# Patient Record
(demographics unavailable — no encounter records)

---

## 2024-12-19 NOTE — HISTORY OF PRESENT ILLNESS
[FreeTextEntry1] : 24 Xiomara is here in follow-up. No seizure like activity since last visit - denies tongue biting, muscle weakness, involuntary movements, staring, confusion/ disorientation, or urinary incontinence. Tolerating keppra well, compliant with medications. No recent fall or injury.  Started to drive short distances, daytime, no highways, and local roads.  Wishes to resume driving on highways. BP at goal.  Exercise classes throughout the week.  Diet varies - - enjoys ice cream, could use improvement with increasing vegetables. Hydrating adequately.   MD Ho HPI's " 24 Xiomara denies seizure like activity since last visit. She reports fatigue related to keppra has fully resolved and is tolerating it well.   24 ambulatory EEG reveals occasional bitemporal slowing. No epileptiform discharges. Suggestive of focal dysfunction in these regions.   Medications- Keppra 500mg twice daily - feels fine, fatigue has fully resolved, no increased irritability reported  Vitamin D 1000 units daily Asa 81mg Atorvastatin 80mg Losartan 100mg  EPILEPSY HPI: Risk Factors - lesional -   complications - febrile seizures - no  development delay - no family hx seizures -no head trauma -no meningitis or encephalitis -no  stroke - yes    Epilepsy Therapy: Current Medications -Keppra  Past Medications/Side effects - fatigue when first taking Keppra now resolved.    Previous Diagnostics:  Last EEG 24 report below  last MRI Brain 24 report below    Zeynep Fernández NP collaborated with the neurologist during today's visit.   ********************************** Ms. XIOMARA GABRIEL is a 73 year old female here today in neurology consultation following discharge from [facility] 2024 dx syncope. Hx ischemic stroke , PFO closure Aug 2022, and hld.   Xiomara is escorted by her  today. Pt is seen for post hospital follow-up. She denies episodes of dizziness, falls, change of mental status or staring since hospital discharge. Her  reports he found pt in the bathroom during the night 2024 after hearing noise of fall, pt was confused and appeared frightened, incontinence of urine, she was sitting on the floor, drifted to her right, appeared to be unconscious for 2 mins, after he called 911, pt was conscious asking to go back to bed, when pt came to she did not know what happened.   On 2024 Xiomara reports one alcoholic drink a few hours prior to episode of LOC in BR, and also one dose of Coricidin HBP Max Strength Night for allergy symptoms. She was started on  mg every 12 hours after EEG at [facility] showed rare left temporal spikes.    She feels very tired on levetiracetam. Would like to consider changing it. No history of episodes of loss of consciousness in the past.   Per chart notes, in 2022, she presented with an episode of aphasia which lasted 20 minutes.  MRI showed recent stroke and she was discharged on aspirin, plavix and statin.    Pt was seen by Dr Madsen following  stroke on 2022, 3/28/2023, and most recently 2024 (following dizziness episodes)   Xiomara reports on 2024 prior to the "grocery store incident of dizziness", pt had just received PT with mechanical neck traction for chronic neck stiffness.   2024 and 2024 ophthalmology appts reportedly with normal results    Medications- Keppra 500mg twice daily  - very tired, symptom not improving  Vitamin D 1000 units daily Asa 81mg Atorvastatin 80mg Losartan 100mg  ADMIT/REG DATE: 24 DISCHARGE DATE: [facility] REPORT DATE/TIME: 24 1011 Primary Care Physician Tiffanie Wasserman MD Discharge Note This is a 73 year old female with history of CVA, HTN, PFO repair who is being observed for syncope vs. seizure. Patient woke up early morning to urinate. After going to the bathroom, her mouth felt dry. She put water on her face and fell. She is uncertain if she lost consciousness at that time as she does not remember falling. Patient's  ran to her side and per patient noted patient to have a blank stare. Patient then lost consciousness and had episode of urinary incontinence. Patient does not recall this episode. Upon awakening, patient was aware of her surroundings, but felt tired. One week ago, patient had an event at the grocery store where she felt disoriented and her vision was unusual. This resolved within minutes. She had reportedly normal outpatient Brain MRI. While in ED, patient's prolactin was found to be elevated. Patient was evaluated by neurology Dr. Madsen evaluated patient and ordered EEG. Patient was placed observation . She was monitored on telemetry with no events. Patient had an EEG which showed rare temporal spikes. Neurology evaluated the patient and recommended starting a trial of Keppra for possible seizure disorder. Patient started on Keppra and advised to follow-up with Dr. Madsen . Patient was recommended not to drive. Echocardiogram with preserved LV ejection fraction aortic root dilatation about 4.1 cm, advised follow-up with cardiology.   2024 prolactin 26.9, GFR 53,   [facility] EEG 2024 [facility] inpatient hospital eeg: rare left temporal spikes but no seizure activity.  2024 [facility] CT HEAD   No obvious acute intracranial hemorrhage or displaced skull fracture. If   clinically indicated, short term follow-up or MRI may be obtained for further evaluation.  [facility]  MRI brain without contrast  2024   No acute infarct, encephalomalacia, midline shift or hemorrhage  Nonspecific extensive T2/FLAIR hyperintensity within white matter may relate   to small vessel ischemic disease/old infarcts although other etiologies   including demyelinating disease not excluded.   Small old left thalamic lacunar infarct and left cerebellar old infarcts. Mild   pontine white matter small vessel ischemic disease.     Zeynep Fernández NP collaborated with the neurologist during today's visit.

## 2024-12-19 NOTE — ASSESSMENT
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 Ambulatory EEG 6/2024 with occasional bitemporal slowing, no spikes.  Tolerating keppra 500mg BID well. Driving short distances, daytime hours and no highways.  Reiterated to pt if you do not feel well to not drive.   Stroke prevention- continue ASA 81mg daily  BP at goal <130/80 Follows with cardiologist Dr Arenas - Gopium - - s/p PFO closure currently on atorvastatin 80mg, LDL 60 (8/2024) at goal <70 A1C 5.6 (2/2024) Encouraged Mediterranean style diet    Seizure Safety:  Wear a helmet when biking or horseback riding No unsupervised swimming Showers rather than baths - no bath alone - risk of drowning Adjust the temperature on hot water heater to avoid scalding If uncontrolled seizures, use microwave rather than stovetop Dont lock door in bathroom, bedroom or on places If fall off bed with seizures, try sleeping with mattress on the floor Use soft or padded furniture Avoid high ladders Take medication as prescribed Follow driving regulations of people with epilepsy. Please visit [employer] : [email or web address]    Follow up in 3 months

## 2024-12-19 NOTE — DATA REVIEWED
[de-identified] : [facility]  MRI brain without contrast  4/30/2024   Multiplanar and multiecho sequence imaging of brain obtained.   Comparison made to CT head 4/30/2024     The midline sagittal structures including the pituitary, corpus callosum,   pineal and craniocervical junction regions are unremarkable.   There is mild prominence of cortical sulci, fissures and ventricles related to   mild generalized volume loss. There is extensive T2/FLAIR hyperintensity   within the periventricular and subcortical white matter which is nonspecific   and may relate to small vessel ischemic disease/old infarcts, although other   etiologies such as demyelinating disease not excluded. There is a small old   left thalamic lacunar infarct. There is mild pontine small vessel ischemic   disease. There are small old left cerebellar lacunar infarcts. Diffusion   imaging reveals no acute or recent infarct. There is no hemorrhage. There are   no abnormal extra-axial collections.   The distal internal carotid and vertebral basilar artery flow-voids are   intact.      There is no pathologic marrow placement. The visualized orbits are   unremarkable. There is no acute sinusitis.     Impression:   No acute infarct, encephalomalacia, midline shift or hemorrhage  Nonspecific extensive T2/FLAIR hyperintensity within white matter may relate   to small vessel ischemic disease/old infarcts although other etiologies   including demyelinating disease not excluded.   Small old left thalamic lacunar infarct and left cerebellar old infarcts. Mild   pontine white matter small vessel ischemic disease.   [de-identified] : 6/7/24 ambulatory EEG reveals occasional bitemporal slowing. No epileptiform discharges. ********************************* [facility] EEG 5/7/2024 [facility] inpatient hospital eeg: rare left temporal spikes but no seizure activity. [de-identified] : 5/6/2024 prolactin 26.9, GFR 53,  *************************************** 5/6/2024 [facility] CT HEAD   PROCEDURE:  CT HEAD OR BRAIN                                                                                                            CLINICAL INDICATION: fall/ hit head on aspirin    TECHNIQUE: CT axial images of the head were obtained without intravenous   contrast. Computer-reconstructed coronal and sagittal images were obtained.    COMPARISON: 4/30/2024.    FINDINGS: There is no obvious acute intracranial hemorrhage, mass effect or   midline shift. Nonspecific moderate periventricular and subcortical white   matter hypodensities are again noted and may be related to age-indeterminate   microvascular ischemic changes/infarcts. Small hypodensities are again noted   at the left thalamus and basal ganglia and may be related to   age-indeterminate/old infarct. Cortical sulci and ventricles are stable.      There is no depressed skull fracture. Mild stranding/edema at the left   occipital scalp soft tissue. There is sinus mucosal thickening. The   tympanomastoid region is unremarkable.      IMPRESSION:    No obvious acute intracranial hemorrhage or displaced skull fracture. If   clinically indicated, short term follow-up or MRI may be obtained for further evaluation.

## 2024-12-19 NOTE — PHYSICAL EXAM
[FreeTextEntry1] : Physical Exam In NAD Mood appropriate affect   Mental Status Awake, alert and oriented to person, place, time and situation. Provides detailed history. Language: speech is fluent  Knows today's date and day of week Serial 7's 5/5 Registration 3/3 Delayed recall 3/3   Cranial Nerves II: VFF III, IV, VI: PERRL, EOMI. V: Facial sensation is normal B/L. VII: Facial strength is symmetric. VIII: wnl IX, X: Palate is midline and elevates symmetrically. XI: Trapezius normal strength. XII: Tongue midline without atrophy or fasciculations.  MOTOR EXAM Muscle tone - no evidence of rigidity or resistance in all 4 extremities. No atrophy or fasciculations. Muscle Strength: arms and legs, proximal and distal flexors and extensors are normal. No UE drift.   COORDINATION Finger to nose: Normal. Heel to shin:    SENSORY Vibration:  Sensation: light touch intact    GAIT Normal

## 2024-12-19 NOTE — HISTORY OF PRESENT ILLNESS
[FreeTextEntry1] : 24 Xiomara is here in follow-up. No seizure like activity since last visit - denies tongue biting, muscle weakness, involuntary movements, staring, confusion/ disorientation, or urinary incontinence. Tolerating keppra well, compliant with medications. No recent fall or injury.  Started to drive short distances, daytime, no highways, and local roads.  Wishes to resume driving on highways. BP at goal.  Exercise classes throughout the week.  Diet varies - - enjoys ice cream, could use improvement with increasing vegetables. Hydrating adequately.   MD Ho HPI's " 24 Xiomara denies seizure like activity since last visit. She reports fatigue related to keppra has fully resolved and is tolerating it well.   24 ambulatory EEG reveals occasional bitemporal slowing. No epileptiform discharges. Suggestive of focal dysfunction in these regions.   Medications- Keppra 500mg twice daily - feels fine, fatigue has fully resolved, no increased irritability reported  Vitamin D 1000 units daily Asa 81mg Atorvastatin 80mg Losartan 100mg  EPILEPSY HPI: Risk Factors - lesional -   complications - febrile seizures - no  development delay - no family hx seizures -no head trauma -no meningitis or encephalitis -no  stroke - yes    Epilepsy Therapy: Current Medications -Keppra  Past Medications/Side effects - fatigue when first taking Keppra now resolved.    Previous Diagnostics:  Last EEG 24 report below  last MRI Brain 24 report below    Zeynep Fernández NP collaborated with the neurologist during today's visit.   ********************************** Ms. XIOMARA GABRIEL is a 73 year old female here today in neurology consultation following discharge from [facility] 2024 dx syncope. Hx ischemic stroke , PFO closure Aug 2022, and hld.   Xiomara is escorted by her  today. Pt is seen for post hospital follow-up. She denies episodes of dizziness, falls, change of mental status or staring since hospital discharge. Her  reports he found pt in the bathroom during the night 2024 after hearing noise of fall, pt was confused and appeared frightened, incontinence of urine, she was sitting on the floor, drifted to her right, appeared to be unconscious for 2 mins, after he called 911, pt was conscious asking to go back to bed, when pt came to she did not know what happened.   On 2024 Xiomara reports one alcoholic drink a few hours prior to episode of LOC in BR, and also one dose of Coricidin HBP Max Strength Night for allergy symptoms. She was started on  mg every 12 hours after EEG at [facility] showed rare left temporal spikes.    She feels very tired on levetiracetam. Would like to consider changing it. No history of episodes of loss of consciousness in the past.   Per chart notes, in 2022, she presented with an episode of aphasia which lasted 20 minutes.  MRI showed recent stroke and she was discharged on aspirin, plavix and statin.    Pt was seen by Dr Madsen following  stroke on 2022, 3/28/2023, and most recently 2024 (following dizziness episodes)   Xiomara reports on 2024 prior to the "grocery store incident of dizziness", pt had just received PT with mechanical neck traction for chronic neck stiffness.   2024 and 2024 ophthalmology appts reportedly with normal results    Medications- Keppra 500mg twice daily  - very tired, symptom not improving  Vitamin D 1000 units daily Asa 81mg Atorvastatin 80mg Losartan 100mg  ADMIT/REG DATE: 24 DISCHARGE DATE: [facility] REPORT DATE/TIME: 24 1011 Primary Care Physician Tiffanie Wasserman MD Discharge Note This is a 73 year old female with history of CVA, HTN, PFO repair who is being observed for syncope vs. seizure. Patient woke up early morning to urinate. After going to the bathroom, her mouth felt dry. She put water on her face and fell. She is uncertain if she lost consciousness at that time as she does not remember falling. Patient's  ran to her side and per patient noted patient to have a blank stare. Patient then lost consciousness and had episode of urinary incontinence. Patient does not recall this episode. Upon awakening, patient was aware of her surroundings, but felt tired. One week ago, patient had an event at the grocery store where she felt disoriented and her vision was unusual. This resolved within minutes. She had reportedly normal outpatient Brain MRI. While in ED, patient's prolactin was found to be elevated. Patient was evaluated by neurology Dr. Madsen evaluated patient and ordered EEG. Patient was placed observation . She was monitored on telemetry with no events. Patient had an EEG which showed rare temporal spikes. Neurology evaluated the patient and recommended starting a trial of Keppra for possible seizure disorder. Patient started on Keppra and advised to follow-up with Dr. Madsen . Patient was recommended not to drive. Echocardiogram with preserved LV ejection fraction aortic root dilatation about 4.1 cm, advised follow-up with cardiology.   2024 prolactin 26.9, GFR 53,   [facility] EEG 2024 [facility] inpatient hospital eeg: rare left temporal spikes but no seizure activity.  2024 [facility] CT HEAD   No obvious acute intracranial hemorrhage or displaced skull fracture. If   clinically indicated, short term follow-up or MRI may be obtained for further evaluation.  [facility]  MRI brain without contrast  2024   No acute infarct, encephalomalacia, midline shift or hemorrhage  Nonspecific extensive T2/FLAIR hyperintensity within white matter may relate   to small vessel ischemic disease/old infarcts although other etiologies   including demyelinating disease not excluded.   Small old left thalamic lacunar infarct and left cerebellar old infarcts. Mild   pontine white matter small vessel ischemic disease.     Zeynep Fernández NP collaborated with the neurologist during today's visit.

## 2024-12-19 NOTE — DATA REVIEWED
[de-identified] : [facility]  MRI brain without contrast  4/30/2024   Multiplanar and multiecho sequence imaging of brain obtained.   Comparison made to CT head 4/30/2024     The midline sagittal structures including the pituitary, corpus callosum,   pineal and craniocervical junction regions are unremarkable.   There is mild prominence of cortical sulci, fissures and ventricles related to   mild generalized volume loss. There is extensive T2/FLAIR hyperintensity   within the periventricular and subcortical white matter which is nonspecific   and may relate to small vessel ischemic disease/old infarcts, although other   etiologies such as demyelinating disease not excluded. There is a small old   left thalamic lacunar infarct. There is mild pontine small vessel ischemic   disease. There are small old left cerebellar lacunar infarcts. Diffusion   imaging reveals no acute or recent infarct. There is no hemorrhage. There are   no abnormal extra-axial collections.   The distal internal carotid and vertebral basilar artery flow-voids are   intact.      There is no pathologic marrow placement. The visualized orbits are   unremarkable. There is no acute sinusitis.     Impression:   No acute infarct, encephalomalacia, midline shift or hemorrhage  Nonspecific extensive T2/FLAIR hyperintensity within white matter may relate   to small vessel ischemic disease/old infarcts although other etiologies   including demyelinating disease not excluded.   Small old left thalamic lacunar infarct and left cerebellar old infarcts. Mild   pontine white matter small vessel ischemic disease.   [de-identified] : 6/7/24 ambulatory EEG reveals occasional bitemporal slowing. No epileptiform discharges. ********************************* [facility] EEG 5/7/2024 [facility] inpatient hospital eeg: rare left temporal spikes but no seizure activity. [de-identified] : 5/6/2024 prolactin 26.9, GFR 53,  *************************************** 5/6/2024 [facility] CT HEAD   PROCEDURE:  CT HEAD OR BRAIN                                                                                                            CLINICAL INDICATION: fall/ hit head on aspirin    TECHNIQUE: CT axial images of the head were obtained without intravenous   contrast. Computer-reconstructed coronal and sagittal images were obtained.    COMPARISON: 4/30/2024.    FINDINGS: There is no obvious acute intracranial hemorrhage, mass effect or   midline shift. Nonspecific moderate periventricular and subcortical white   matter hypodensities are again noted and may be related to age-indeterminate   microvascular ischemic changes/infarcts. Small hypodensities are again noted   at the left thalamus and basal ganglia and may be related to   age-indeterminate/old infarct. Cortical sulci and ventricles are stable.      There is no depressed skull fracture. Mild stranding/edema at the left   occipital scalp soft tissue. There is sinus mucosal thickening. The   tympanomastoid region is unremarkable.      IMPRESSION:    No obvious acute intracranial hemorrhage or displaced skull fracture. If   clinically indicated, short term follow-up or MRI may be obtained for further evaluation.

## 2024-12-19 NOTE — HISTORY OF PRESENT ILLNESS
[FreeTextEntry1] : 24 Xiomara is here in follow-up. No seizure like activity since last visit - denies tongue biting, muscle weakness, involuntary movements, staring, confusion/ disorientation, or urinary incontinence. Tolerating keppra well, compliant with medications. No recent fall or injury.  Started to drive short distances, daytime, no highways, and local roads.  Wishes to resume driving on highways. BP at goal.  Exercise classes throughout the week.  Diet varies - - enjoys ice cream, could use improvement with increasing vegetables. Hydrating adequately.   MD Ho HPI's " 24 Xiomara denies seizure like activity since last visit. She reports fatigue related to keppra has fully resolved and is tolerating it well.   24 ambulatory EEG reveals occasional bitemporal slowing. No epileptiform discharges. Suggestive of focal dysfunction in these regions.   Medications- Keppra 500mg twice daily - feels fine, fatigue has fully resolved, no increased irritability reported  Vitamin D 1000 units daily Asa 81mg Atorvastatin 80mg Losartan 100mg  EPILEPSY HPI: Risk Factors - lesional -   complications - febrile seizures - no  development delay - no family hx seizures -no head trauma -no meningitis or encephalitis -no  stroke - yes    Epilepsy Therapy: Current Medications -Keppra  Past Medications/Side effects - fatigue when first taking Keppra now resolved.    Previous Diagnostics:  Last EEG 24 report below  last MRI Brain 24 report below    Zeynep Fernández NP collaborated with the neurologist during today's visit.   ********************************** Ms. XIOMARA GABRIEL is a 73 year old female here today in neurology consultation following discharge from [facility] 2024 dx syncope. Hx ischemic stroke , PFO closure Aug 2022, and hld.   Xiomara is escorted by her  today. Pt is seen for post hospital follow-up. She denies episodes of dizziness, falls, change of mental status or staring since hospital discharge. Her  reports he found pt in the bathroom during the night 2024 after hearing noise of fall, pt was confused and appeared frightened, incontinence of urine, she was sitting on the floor, drifted to her right, appeared to be unconscious for 2 mins, after he called 911, pt was conscious asking to go back to bed, when pt came to she did not know what happened.   On 2024 Xiomara reports one alcoholic drink a few hours prior to episode of LOC in BR, and also one dose of Coricidin HBP Max Strength Night for allergy symptoms. She was started on  mg every 12 hours after EEG at [facility] showed rare left temporal spikes.    She feels very tired on levetiracetam. Would like to consider changing it. No history of episodes of loss of consciousness in the past.   Per chart notes, in 2022, she presented with an episode of aphasia which lasted 20 minutes.  MRI showed recent stroke and she was discharged on aspirin, plavix and statin.    Pt was seen by Dr Madsen following  stroke on 2022, 3/28/2023, and most recently 2024 (following dizziness episodes)   Xiomara reports on 2024 prior to the "grocery store incident of dizziness", pt had just received PT with mechanical neck traction for chronic neck stiffness.   2024 and 2024 ophthalmology appts reportedly with normal results    Medications- Keppra 500mg twice daily  - very tired, symptom not improving  Vitamin D 1000 units daily Asa 81mg Atorvastatin 80mg Losartan 100mg  ADMIT/REG DATE: 24 DISCHARGE DATE: [facility] REPORT DATE/TIME: 24 1011 Primary Care Physician Tiffanie Wasserman MD Discharge Note This is a 73 year old female with history of CVA, HTN, PFO repair who is being observed for syncope vs. seizure. Patient woke up early morning to urinate. After going to the bathroom, her mouth felt dry. She put water on her face and fell. She is uncertain if she lost consciousness at that time as she does not remember falling. Patient's  ran to her side and per patient noted patient to have a blank stare. Patient then lost consciousness and had episode of urinary incontinence. Patient does not recall this episode. Upon awakening, patient was aware of her surroundings, but felt tired. One week ago, patient had an event at the grocery store where she felt disoriented and her vision was unusual. This resolved within minutes. She had reportedly normal outpatient Brain MRI. While in ED, patient's prolactin was found to be elevated. Patient was evaluated by neurology Dr. Madsen evaluated patient and ordered EEG. Patient was placed observation . She was monitored on telemetry with no events. Patient had an EEG which showed rare temporal spikes. Neurology evaluated the patient and recommended starting a trial of Keppra for possible seizure disorder. Patient started on Keppra and advised to follow-up with Dr. Madsen . Patient was recommended not to drive. Echocardiogram with preserved LV ejection fraction aortic root dilatation about 4.1 cm, advised follow-up with cardiology.   2024 prolactin 26.9, GFR 53,   [facility] EEG 2024 [facility] inpatient hospital eeg: rare left temporal spikes but no seizure activity.  2024 [facility] CT HEAD   No obvious acute intracranial hemorrhage or displaced skull fracture. If   clinically indicated, short term follow-up or MRI may be obtained for further evaluation.  [facility]  MRI brain without contrast  2024   No acute infarct, encephalomalacia, midline shift or hemorrhage  Nonspecific extensive T2/FLAIR hyperintensity within white matter may relate   to small vessel ischemic disease/old infarcts although other etiologies   including demyelinating disease not excluded.   Small old left thalamic lacunar infarct and left cerebellar old infarcts. Mild   pontine white matter small vessel ischemic disease.     Zeynep Fernández NP collaborated with the neurologist during today's visit.

## 2025-03-25 NOTE — DATA REVIEWED
[de-identified] : 4/2024:  No acute infarct, encephalomalacia, midline shift or hemorrhage. Nonspecific extensive T2/FLAIR hyperintensity within white matter may relate to small vessel ischemic disease/old infarcts although other etiologies including demyelinating disease not excluded. Small old left thalamic lacunar infarct and left cerebellar old infarcts. Mild pontine white matter small vessel ischemic disease. [de-identified] : 6/17-18/24: Ambulatory EEG: occasional bitemporal slowing.  [de-identified] : 8/1/24: LDL 60 , creatinine 0.9, WBC 9.2 , hemoglobin 14.7

## 2025-03-25 NOTE — DISCUSSION/SUMMARY
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 Ambulatory EEG 6/2024 with occasional bitemporal slowing, no spikes. Tolerating keppra 500mg BID well. Driving short distances, daytime hours and no highways. Reiterated to pt if you do not feel well to not drive.

## 2025-03-25 NOTE — ASSESSMENT
[FreeTextEntry1] : Please continue levetiracetam (keppra) 500 mg every 12 hours Driving on highways okay in May 2025 Return to clinic in six months, will order an EEG at that time RTC with Dr. SIMMONS or CARLY Eduardo NP   Seizure Safety:   Wear a helmet when biking or horseback riding No unsupervised swimming Showers rather than baths - no bath alone - risk of drowning  Adjust the temperature on hot water heater to avoid scalding If uncontrolled seizures, use microwave rather than stovetop Dont lock door in bathroom, bedroom or on places  If fall off bed with seizures, try sleeping with mattress on the floor  Use soft or padded furniture  Avoid high ladders  Take medication as prescribed Follow driving regulations of people with epilepsy.  Please visit [facility] [facility] : [email or web address]://www.epilepsy.com/

## 2025-03-25 NOTE — PHYSICAL EXAM
[FreeTextEntry1] : Mental Status: Alert to month, year, off on the day by 2. 3/3 registration of 3 items, 3/3 recall at three minutes  Language/Speech : speech fluent Cranial Nerves  II: Pupils equal and reactive,  VFF full III, IV, VI: EOMI V : normal sensation in V1-V3 b/l VII : no asymmetry, no nasolabial fold flattening VIII: normal hearing to voice  IX, X: normal palatal elevation, uvula midline XI: 5/5 head turn and 5/5 shoulder shrug bilaterally XII : midline tongue protrusion Motor: no drift in limbs, normal bulk and tone, 5/5 in all extremities Sensory: normal to light touch, Coordination: Normal finger to nose Gait: steady gait

## 2025-03-25 NOTE — HISTORY OF PRESENT ILLNESS
[FreeTextEntry1] : Last seen in clinic on 12/17/24. Doing well. Very cautious. Does not miss medication dosage. Does not drink at all. No episodes of confusion/ loss of consciousness concerning for seizure. Had blood stool found on a colonoscopy. On mesalamine. Was told she had colitis and diverticulitis when she had irregular bowel movements.   Medications  aspirin 81  atorvastatin 80  levetiracetam 500 mg bid losartan 100 mg qd ______________________________ 9/5/24  Ms. XIOMARA GABRIEL is a 73 year old female here today in neurology consultation following discharge from [facility] 5/7/2024 dx syncope. Hx ischemic stroke 2022, PFO closure Aug 2022, and hld.   Xiomara is escorted by her  today. Pt is seen for post hospital follow-up. She denies episodes of dizziness, falls, change of mental status or staring since hospital discharge. Her  reports he found pt in the bathroom during the night 5/6/2024 after hearing noise of fall, pt was confused and appeared frightened, incontinence of urine, she was sitting on the floor, drifted to her right, appeared to be unconscious for 2 mins, after he called 911, pt was conscious asking to go back to bed, when pt came to she did not know what happened.   On 5/6/2024 Xiomara reports one alcoholic drink a few hours prior to episode of LOC in [place], and also one dose of Coricidin HBP Max Strength Night for allergy symptoms. She was started on  mg every 12 hours after EEG at [facility] showed rare left temporal spikes.    She feels very tired on levetiracetam. Would like to consider changing it. No history of episodes of loss of consciousness in the past.   Per chart notes, in 6/2022, she presented with an episode of aphasia which lasted 20 minutes.  MRI showed recent stroke and she was discharged on aspirin, plavix and statin.    Pt was seen by Dr Madsen following 2022 stroke on 9/6/2022, 3/28/2023, and most recently 4/23/2024 (following dizziness episodes)   Xiomara reports on 4/19/2024 prior to the "grocery store incident of dizziness", pt had just received PT with mechanical neck traction for chronic neck stiffness.   4/29/2024 and 6/6/2024 ophthalmology appts reportedly with normal results    Medications- Keppra 500mg twice daily  - very tired, symptom not improving  Vitamin D 1000 units daily Asa 81mg Atorvastatin 80mg Losartan 100mg  ADMIT/REG DATE: 05/06/24 DISCHARGE DATE: [facility] REPORT DATE/TIME: 05/07/24 1011 Primary Care Physician Tiffanie Wasserman MD Discharge Note This is a 73 year old female with history of CVA, HTN, PFO repair who is being observed for syncope vs. seizure. Patient woke up early morning to urinate. After going to the bathroom, her mouth felt dry. She put water on her face and fell. She is uncertain if she lost consciousness at that time as she does not remember falling. Patient's  ran to her side and per patient noted patient to have a blank stare. Patient then lost consciousness and had episode of urinary incontinence. Patient does not recall this episode. Upon awakening, patient was aware of her surroundings, but felt tired. One week ago, patient had an event at the grocery store where she felt disoriented and her vision was unusual. This resolved within minutes. She had reportedly normal outpatient Brain MRI. While in ED, patient's prolactin was found to be elevated. Patient was evaluated by neurology Dr. Madsen evaluated patient and ordered EEG. Patient was placed observation . She was monitored on telemetry with no events. Patient had an EEG which showed rare temporal spikes. Neurology evaluated the patient and recommended starting a trial of Keppra for possible seizure disorder. Patient started on Keppra and advised to follow-up with Dr. Madsen . Patient was recommended not to drive. Echocardiogram with preserved LV ejection fraction aortic root dilatation about 4.1 cm, advised follow-up with cardiology.   5/6/2024 prolactin 26.9, GFR 53,   [facility] EEG 5/7/2024 [facility] inpatient hospital eeg: rare left temporal spikes but no seizure activity.  5/6/2024 [facility] CT HEAD   No obvious acute intracranial hemorrhage or displaced skull fracture. If   clinically indicated, short term follow-up or MRI may be obtained for further evaluation.  [facility]  MRI brain without contrast  4/30/2024   No acute infarct, encephalomalacia, midline shift or hemorrhage  Nonspecific extensive T2/FLAIR hyperintensity within white matter may relate   to small vessel ischemic disease/old infarcts although other etiologies   including demyelinating disease not excluded.   Small old left thalamic lacunar infarct and left cerebellar old infarcts. Mild   pontine white matter small vessel ischemic disease.     Zeynep Fernández NP collaborated with the neurologist during today's visit.